# Patient Record
Sex: FEMALE | Race: WHITE | NOT HISPANIC OR LATINO | Employment: UNEMPLOYED | URBAN - METROPOLITAN AREA
[De-identification: names, ages, dates, MRNs, and addresses within clinical notes are randomized per-mention and may not be internally consistent; named-entity substitution may affect disease eponyms.]

---
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
## 2025-01-15 ENCOUNTER — HOSPITAL ENCOUNTER (OUTPATIENT)
Dept: GENERAL RADIOLOGY | Facility: CLINIC | Age: 20
Discharge: HOME | End: 2025-01-15
Attending: NURSE PRACTITIONER
Payer: COMMERCIAL

## 2025-01-15 DIAGNOSIS — R05.3 CHRONIC COUGH: ICD-10-CM

## 2025-01-15 PROCEDURE — 71046 X-RAY EXAM CHEST 2 VIEWS: CPT

## 2025-01-21 ENCOUNTER — TRANSFERRED RECORDS (OUTPATIENT)
Dept: HEALTH INFORMATION MANAGEMENT | Facility: CLINIC | Age: 20
End: 2025-01-21
Payer: COMMERCIAL

## 2025-01-21 ENCOUNTER — TRANSCRIBE ORDERS (OUTPATIENT)
Dept: OTHER | Age: 20
End: 2025-01-21

## 2025-01-21 DIAGNOSIS — K21.9 GASTRO-ESOPHAGEAL REFLUX DISEASE WITHOUT ESOPHAGITIS: Primary | ICD-10-CM

## 2025-01-21 DIAGNOSIS — R05.3 CHRONIC COUGH: ICD-10-CM

## 2025-02-12 ENCOUNTER — HOSPITAL ENCOUNTER (OUTPATIENT)
Dept: RESPIRATORY THERAPY | Facility: CLINIC | Age: 20
Discharge: HOME | End: 2025-02-12
Attending: NURSE PRACTITIONER
Payer: COMMERCIAL

## 2025-02-12 DIAGNOSIS — K21.9 GASTRO-ESOPHAGEAL REFLUX DISEASE WITHOUT ESOPHAGITIS: ICD-10-CM

## 2025-02-12 DIAGNOSIS — R05.3 CHRONIC COUGH: ICD-10-CM

## 2025-02-12 LAB
DLCOUNC-%PRED-PRE: 99 %
DLCOUNC-PRE: 21.18 ML/MIN/MMHG
DLCOUNC-PRED: 21.28 ML/MIN/MMHG
ERV-%PRED-PRE: 116 %
ERV-PRE: 1.4 L
ERV-PRED: 1.2 L
EXPTIME-PRE: 2.75 SEC
FEF2575-%PRED-PRE: 83 %
FEF2575-PRE: 3.05 L/SEC
FEF2575-PRED: 3.63 L/SEC
FEFMAX-%PRED-PRE: 67 %
FEFMAX-PRE: 4.51 L/SEC
FEFMAX-PRED: 6.66 L/SEC
FEV1-%PRED-PRE: 100 %
FEV1-PRE: 3.01 L
FEV1FEV6-PRE: 85 %
FEV1FEV6-PRED: 87 %
FEV1FVC-PRE: 85 %
FEV1FVC-PRED: 89 %
FEV1SVC-PRE: 85 %
FEV1SVC-PRED: 81 %
FIFMAX-PRE: 0.83 L/SEC
FRCPLETH-%PRED-PRE: 106 %
FRCPLETH-PRE: 2.79 L
FRCPLETH-PRED: 2.63 L
FVC-%PRED-PRE: 105 %
FVC-PRE: 3.54 L
FVC-PRED: 3.35 L
IC-%PRED-PRE: 88 %
IC-PRE: 2.12 L
IC-PRED: 2.4 L
RVPLETH-%PRED-PRE: 107 %
RVPLETH-PRE: 1.39 L
RVPLETH-PRED: 1.29 L
TLCPLETH-%PRED-PRE: 103 %
TLCPLETH-PRE: 4.91 L
TLCPLETH-PRED: 4.74 L
VA-%PRED-PRE: 94 %
VA-PRE: 4.2 L
VC-%PRED-PRE: 95 %
VC-PRE: 3.52 L
VC-PRED: 3.67 L

## 2025-02-12 PROCEDURE — 94726 PLETHYSMOGRAPHY LUNG VOLUMES: CPT

## 2025-02-12 PROCEDURE — 94729 DIFFUSING CAPACITY: CPT

## 2025-02-12 PROCEDURE — 94010 BREATHING CAPACITY TEST: CPT

## 2025-02-12 NOTE — DISCHARGE INSTRUCTIONS
Thank you for completing pulmonary function testing today.  All results will be scanned into your epic results for your doctor to review.  Please resume taking all your current prescribed medications and diet as directed by your provider.   If you have not heard from your provider about your testing within two weeks and do not have a follow-up appointment scheduled with them please contact your provider about any questions you have concerning your testing.   Thank you  [facility]